# Patient Record
Sex: FEMALE | Race: WHITE | URBAN - NONMETROPOLITAN AREA
[De-identification: names, ages, dates, MRNs, and addresses within clinical notes are randomized per-mention and may not be internally consistent; named-entity substitution may affect disease eponyms.]

---

## 2020-04-09 ENCOUNTER — TELEPHONE (OUTPATIENT)
Dept: PEDIATRICS | Facility: OTHER | Age: 65
End: 2020-04-09

## 2020-04-09 DIAGNOSIS — F32.9 MAJOR DEPRESSIVE DISORDER, SINGLE EPISODE, UNSPECIFIED: ICD-10-CM

## 2020-04-09 DIAGNOSIS — M54.16 RADICULOPATHY, LUMBAR REGION: ICD-10-CM

## 2020-04-09 DIAGNOSIS — Z91.81 HISTORY OF FALLING: ICD-10-CM

## 2020-04-09 DIAGNOSIS — F03.90 UNSPECIFIED DEMENTIA WITHOUT BEHAVIORAL DISTURBANCE: Primary | ICD-10-CM

## 2020-04-09 DIAGNOSIS — I12.9 HYPERTENSIVE CHRONIC KIDNEY DISEASE W STG 1-4/UNSP CHR KDNY: ICD-10-CM

## 2020-04-09 DIAGNOSIS — H81.13 BENIGN PAROXYSMAL VERTIGO OF BOTH EARS: ICD-10-CM

## 2020-04-09 NOTE — TELEPHONE ENCOUNTER
Dr Carmona reviewed and completed the following home care or hospice form(s) for Certification on Home Care.  This covers the certification period effective 03/24/20 to 05/22/20.    Jaz Mohamud LPN on 4/9/2020 at 1:35 PM